# Patient Record
Sex: MALE | Race: WHITE | Employment: FULL TIME | URBAN - METROPOLITAN AREA
[De-identification: names, ages, dates, MRNs, and addresses within clinical notes are randomized per-mention and may not be internally consistent; named-entity substitution may affect disease eponyms.]

---

## 2018-12-31 ENCOUNTER — HOSPITAL ENCOUNTER (EMERGENCY)
Facility: CLINIC | Age: 28
Discharge: HOME | End: 2018-12-31
Attending: PHYSICIAN ASSISTANT | Admitting: PHYSICIAN ASSISTANT
Payer: COMMERCIAL

## 2018-12-31 VITALS
TEMPERATURE: 98.2 F | DIASTOLIC BLOOD PRESSURE: 74 MMHG | HEART RATE: 79 BPM | OXYGEN SATURATION: 99 % | SYSTOLIC BLOOD PRESSURE: 120 MMHG | RESPIRATION RATE: 18 BRPM | WEIGHT: 175 LBS

## 2018-12-31 DIAGNOSIS — L29.0 ANAL ITCHING: ICD-10-CM

## 2018-12-31 PROCEDURE — 99213 OFFICE O/P EST LOW 20 MIN: CPT | Performed by: PHYSICIAN ASSISTANT

## 2018-12-31 PROCEDURE — G0463 HOSPITAL OUTPT CLINIC VISIT: HCPCS

## 2018-12-31 NOTE — ED PROVIDER NOTES
History     Chief Complaint   Patient presents with     Rectal/perineal Pain     rectal itching only. no pain. no bleeding     HPI  Isrrael Johnson is a 28 year old male who presents with complaints of anal pruritus over the past week.  Patient states his symptoms started after he strained with a bowel movement at which point he experienced some anal pain and a small amount of bleeding.  He states the rectal bleeding and pain subsided shortly thereafter, but he has had persistent generalized pruritus of the anal region.  He denies any aggravating or relieving factors.  He has attempted to apply calmoseptine lotion to the area without improvement.  Patient denies similar symptoms in the past.  He does not have history of hemorrhoids.  Denies fevers, chills, nausea, vomiting, diarrhea, or abdominal pain.      Problem List:    There are no active problems to display for this patient.       Past Medical History:    No past medical history on file.    Past Surgical History:    No past surgical history on file.    Family History:    No family history on file.    Social History:  Marital Status:    Social History     Tobacco Use     Smoking status: Not on file   Substance Use Topics     Alcohol use: Not on file     Drug use: Not on file        Medications:      No current outpatient medications on file.      Review of Systems   Constitutional: Negative.    Gastrointestinal:        Anal itching   Genitourinary: Negative.    Musculoskeletal: Negative.    Skin: Negative.    All other systems reviewed and are negative.      Physical Exam   BP: 120/74  Pulse: 79  Temp: 98.2  F (36.8  C)  Resp: 18  Weight: 79.4 kg (175 lb)  SpO2: 99 %      Physical Exam   Constitutional: He appears well-developed and well-nourished. No distress.   HENT:   Head: Normocephalic and atraumatic.   Eyes: Conjunctivae are normal.   Neck: Neck supple.   Pulmonary/Chest: Effort normal.   Genitourinary: Rectal exam shows no external hemorrhoid, no  internal hemorrhoid, no fissure and no tenderness.   Neurological: He is alert.   Skin: Skin is warm and dry.       ED Course        Procedures    No results found for this or any previous visit (from the past 24 hour(s)).    Medications - No data to display    Assessments & Plan (with Medical Decision Making)     Pt is a 28 year old male who presents with complaints of anal pruritus over the past week.  Patient states his symptoms started after he strained with a bowel movement at which point he experienced some anal pain and a small amount of bleeding.  He states the rectal bleeding and pain subsided shortly thereafter, but he has had persistent generalized pruritus of the anal region.  Pt is afebrile on arrival.  Exam as above.  No evidence of external hemorrhoid or anal fissure on exam.  We discussed that it sounds like he may have experienced an anal fissure given his straining with bowel movement followed by a small amount of bleeding and rectal pain 1 week ago.  Encouraged symptomatic treatments of his itching at home including Hydrocortisone cream twice daily for no more than 1 week along with a barrier cream such as zinc oxide.  Patient does not have any associated systemic symptoms.  Encouraged follow-up on his symptoms.  Return precautions were reviewed.  Hand-outs were provided.    Patient was instructed to follow-up with PCP if no improvement in 5-7 days for continued care and management or sooner if new or worsening symptoms.  He is to return to the ED for persistent and/or worsening symptoms.  Patient expressed understanding of the diagnosis and plan and was discharged home in good condition.    I have reviewed the nursing notes.    I have reviewed the findings, diagnosis, plan and need for follow up with the patient.       Medication List      There are no discharge medications for this visit.         Final diagnoses:   Anal itching       12/31/2018   [facility]  DEPARTMENT      Disclaimer:  This note consists of symbols derived from keyboarding, dictation and/or voice recognition software.  As a result, there may be errors in the script that have gone undetected.  Please consider this when interpreting information found in this chart.     Brie Stanley PA-C  01/01/19 2019       Brie Stanley PA-C  01/01/19 2019

## 2018-12-31 NOTE — DISCHARGE INSTRUCTIONS
Home treatments:    We also suggest a barrier cream containing zinc oxide be applied to the anal area as well as 1% hydrocortisone cream twice daily be used in conjunction with the barrier cream. The topical hydrocortisone should not be used for more than two weeks to avoid skin atrophy.

## 2018-12-31 NOTE — ED AVS SNAPSHOT
[facility] Emergency Department  [address] [address]  Phone:  [phone]  Fax:  [phone]                                    Isrrael Johnson   MRN: [id number]    Department:  [facility] Emergency Department   Date of Visit:  12/31/2018           After Visit Summary Signature Page    I have received my discharge instructions, and my questions have been answered. I have discussed any challenges I see with this plan with the nurse or doctor.    ..........................................................................................................................................  Patient/Patient Representative Signature      ..........................................................................................................................................  Patient Representative Print Name and Relationship to Patient    ..................................................               ................................................  Date                                   Time    ..........................................................................................................................................  Reviewed by Signature/Title    ...................................................              ..............................................  Date                                               Time          22EPIC Rev 08/18

## 2019-01-01 ASSESSMENT — ENCOUNTER SYMPTOMS
ROS GI COMMENTS: ANAL ITCHING
CONSTITUTIONAL NEGATIVE: 1
MUSCULOSKELETAL NEGATIVE: 1